# Patient Record
Sex: FEMALE | Race: WHITE | NOT HISPANIC OR LATINO | Employment: OTHER | URBAN - METROPOLITAN AREA
[De-identification: names, ages, dates, MRNs, and addresses within clinical notes are randomized per-mention and may not be internally consistent; named-entity substitution may affect disease eponyms.]

---

## 2017-07-19 ENCOUNTER — TELEPHONE (OUTPATIENT)
Dept: PEDIATRICS | Facility: CLINIC | Age: 37
End: 2017-07-19

## 2017-07-19 NOTE — TELEPHONE ENCOUNTER
Christine the pharmacy manager at [facility] calling that she has multiple scripts with our logo that she wants to confirm were written by us. Advised that they were not and requested that they be faxed to us. Supplied fax number.

## 2017-07-19 NOTE — LETTER
[address]  [address]  [facility] [id number] [phone]            To Christine, Pharmacy Manager  [facility]      The copies of the prescriptions that were faxed to us are not from this clinic. The provider on these scripts does not practice at a [facility]. This patient has never been seen at [facility].      Sincerely,          Sanjuanita Paez RN

## 2017-07-19 NOTE — TELEPHONE ENCOUNTER
Letter printed and faxed to Christine confirming that the prescriptions were not from this clinic after approval of clinic manager.   Sanjuanita Paez RN

## 2017-07-29 ENCOUNTER — HEALTH MAINTENANCE LETTER (OUTPATIENT)
Age: 37
End: 2017-07-29

## 2017-08-03 ENCOUNTER — TELEPHONE (OUTPATIENT)
Dept: PEDIATRICS | Facility: CLINIC | Age: 37
End: 2017-08-03

## 2017-08-03 NOTE — TELEPHONE ENCOUNTER
"Received a call from the [facility] pharmacy in [place], [place] asking to verify a Rx for oxycodone.  Per RN agreement we called the pharmacy back to confirm we were speaking with the pharmacist.      Spoke with Chiquita, the pharmacist, Rx was brought into the pharmacy on 7/29/17 by \"Anabel\", no last name given.  Pills were dispensed for Oxycodone 180 pills, 0 refills. Kailey Carter was the provider written on the Rx.     Received and copy of the Rx and drivers license # of the individual who dropped off the Rx from the pharmacist.     No police report filed.      Notified clinic administrator.     Della Roberts RN.  Nurse Triage             "

## 2022-12-02 ENCOUNTER — HOSPITAL ENCOUNTER (EMERGENCY)
Facility: CLINIC | Age: 42
Discharge: LEFT BEFORE DISCHARGE | End: 2022-12-02
Payer: MEDICARE

## 2022-12-02 VITALS
DIASTOLIC BLOOD PRESSURE: 74 MMHG | OXYGEN SATURATION: 98 % | TEMPERATURE: 98.5 F | RESPIRATION RATE: 20 BRPM | SYSTOLIC BLOOD PRESSURE: 113 MMHG | HEART RATE: 113 BPM | WEIGHT: 267.64 LBS | BODY MASS INDEX: 61.94 KG/M2 | HEIGHT: 55 IN

## 2022-12-03 NOTE — ED TRIAGE NOTES
Pt presents for evaluation of a boil to right buttock x 2 days. Had a boil in same area requiring surgery in June. Today in shower, felt like boil is getting bigger. Pt did squeeze the area today and noted a pus like drainage with blood.

## 2022-12-05 PROCEDURE — 99284 EMERGENCY DEPT VISIT MOD MDM: CPT

## 2022-12-06 ENCOUNTER — HOSPITAL ENCOUNTER (EMERGENCY)
Facility: CLINIC | Age: 42
Discharge: HOME | End: 2022-12-06
Attending: EMERGENCY MEDICINE | Admitting: EMERGENCY MEDICINE
Payer: MEDICARE

## 2022-12-06 VITALS
DIASTOLIC BLOOD PRESSURE: 96 MMHG | TEMPERATURE: 97.2 F | RESPIRATION RATE: 18 BRPM | WEIGHT: 269.62 LBS | SYSTOLIC BLOOD PRESSURE: 143 MMHG | HEART RATE: 93 BPM | OXYGEN SATURATION: 100 % | BODY MASS INDEX: 258.75 KG/M2

## 2022-12-06 DIAGNOSIS — L02.31 ABSCESS OF BUTTOCK, RIGHT: ICD-10-CM

## 2022-12-06 RX ORDER — ONDANSETRON 4 MG/1
4 TABLET, ORALLY DISINTEGRATING ORAL EVERY 8 HOURS PRN
Qty: 10 TABLET | Refills: 0 | Status: SHIPPED | OUTPATIENT
Start: 2022-12-06 | End: 2022-12-09

## 2022-12-06 RX ORDER — OXYCODONE HYDROCHLORIDE 5 MG/1
5 TABLET ORAL EVERY 6 HOURS PRN
Qty: 12 TABLET | Refills: 0 | Status: SHIPPED | OUTPATIENT
Start: 2022-12-06 | End: 2022-12-09

## 2022-12-06 RX ORDER — DOXYCYCLINE 100 MG/1
100 CAPSULE ORAL 2 TIMES DAILY
Qty: 14 CAPSULE | Refills: 0 | Status: SHIPPED | OUTPATIENT
Start: 2022-12-06 | End: 2022-12-13

## 2022-12-06 ASSESSMENT — ACTIVITIES OF DAILY LIVING (ADL): ADLS_ACUITY_SCORE: 33

## 2022-12-06 ASSESSMENT — ENCOUNTER SYMPTOMS
NAUSEA: 1
FEVER: 0
WOUND: 1
CHILLS: 1

## 2022-12-06 NOTE — ED TRIAGE NOTES
"Presents to triage with c/o a \"boil\" on R buttock that patient noticed a few days ago. Patient stated the area is painful and is not draining anything. Also endorses nausea     Triage Assessment     Row Name 12/05/22 7751       Triage Assessment (Adult)    Airway WDL WDL       Respiratory WDL    Respiratory WDL WDL       Cardiac WDL    Cardiac WDL WDL              "

## 2022-12-06 NOTE — ED PROVIDER NOTES
"  History   Chief Complaint:  Wound Check and Nausea       HPI   Esha Jacob is a 42 year old female with history of hypertension who presents with a \"boil' on her right buttock which she noticed a few days ago. The area is painful, and now is draining. She has chills and nausea. No fever. She has a history of an incision and drainage for a wound in a similar area.     Review of Systems   Constitutional: Positive for chills. Negative for fever.   Gastrointestinal: Positive for nausea.   Skin: Positive for wound.   All other systems reviewed and are negative.    Allergies:  No Known Allergies    Medications:  Albuterol   loratadine   Alprazolam   Lisinopril   Sertraline   Fluticasone   Metformin   semaglutide   Cholecalciferol   Tizanidine   Diazepam   Gabapentin     Past Medical History:     Hyperinsulinemia  Hypertension   TMJ dysfunction   Prediabetes  Carpal tunnel   Amenorrhea   Tobacco use disorder   DJD  Obesity   Hypercholesterolemia   Acid reflux   Endometrial polyp  Maternal postpartum thyroid dysfunction  PCOS  Chronic low back pain    Chronic bronchitis   Depression     Past Surgical History:     section   Carpal tunnel release 2x   Hysteroscopic resection of endometrial polyps   Hysterectomy   Excision right buttock mass     Family History:    Father: CAD    Social History:  The patient presents to the ED by herself.   PCP: Franky, [facility]     Physical Exam     Patient Vitals for the past 24 hrs:   BP Temp Temp src Pulse Resp SpO2 Weight   22 2325 134/69 97.2  F (36.2  C) Temporal 96 18 100 % 122.3 kg (269 lb 10 oz)     Physical Exam  General: Patient is alert, awake and interactive when I enter the room  Head: The scalp, face, and head appear normal  Eyes: Conjunctivae are normal  ENT: The nose is normal, Pinnae are normal, External acoustic canals are normal  Neck: Trachea midline  CV: Pulses are normal.   Resp: No respiratory distress   Musc: Normal muscular " tone, moving all extremities.  Skin: No rash or lesions noted  Rectal: brown stool.  Quarter sized abscess right buttock that has opened and drained.  There is surrounding erythema and warmth concerning for early cellulitis.  Neuro: Speech is normal and fluent. Face is symmetric.   Psych: Normal affect.  Appropriate interactions.    Emergency Department Course     Emergency Department Course:  Reviewed:  I reviewed nursing notes, vitals, past medical history and Care Everywhere    Assessments:  0129 I obtained history and examined the patient as noted above.     Disposition:  The patient was discharged to home.     Impression & Plan     Medical Decision Making:  Patient is a 42-year-old female who presents emergency department with a draining boil on her right buttock.  On physical examination the abscess has fully drained.  No indication for incision and drainage.  However there is some surrounding erythema and warmth concerning for early cellulitis.  Will cover with antibiotics that cover MRSA.  Recommended close follow-up with her primary care physician and returning to the emergency department any new or worsening symptoms.  Patient was given a short course of pain medication to use as needed.    Diagnosis:    ICD-10-CM    1. Abscess of buttock, right  L02.31           Discharge Medications:  New Prescriptions    DOXYCYCLINE HYCLATE (VIBRAMYCIN) 100 MG CAPSULE    Take 1 capsule (100 mg) by mouth 2 times daily for 7 days    ONDANSETRON (ZOFRAN ODT) 4 MG ODT TAB    Take 1 tablet (4 mg) by mouth every 8 hours as needed for nausea    OXYCODONE (ROXICODONE) 5 MG TABLET    Take 1 tablet (5 mg) by mouth every 6 hours as needed for pain       Scribe Disclosure:  SHA, Joshua Kjer, am serving as a scribe at 1:29 AM on 12/6/2022 to document services personally performed by Gaetano Hood MD based on my observations and the provider's statements to me.          Gaetano Hood MD  12/06/22  [id number]

## 2022-12-13 ENCOUNTER — HOSPITAL ENCOUNTER (EMERGENCY)
Facility: CLINIC | Age: 42
Discharge: HOME | End: 2022-12-14
Attending: EMERGENCY MEDICINE | Admitting: EMERGENCY MEDICINE
Payer: MEDICARE

## 2022-12-13 DIAGNOSIS — M54.50 ACUTE BILATERAL LOW BACK PAIN WITHOUT SCIATICA: ICD-10-CM

## 2022-12-13 LAB
ALBUMIN UR-MCNC: NEGATIVE MG/DL
APPEARANCE UR: CLEAR
BILIRUB UR QL STRIP: NEGATIVE
COLOR UR AUTO: YELLOW
GLUCOSE UR STRIP-MCNC: NEGATIVE MG/DL
HGB UR QL STRIP: NEGATIVE
HYALINE CASTS: 1 /LPF
KETONES UR STRIP-MCNC: NEGATIVE MG/DL
LEUKOCYTE ESTERASE UR QL STRIP: NEGATIVE
MUCOUS THREADS #/AREA URNS LPF: PRESENT /LPF
NITRATE UR QL: NEGATIVE
PH UR STRIP: 5.5 [PH] (ref 5–7)
RBC URINE: 1 /HPF
SP GR UR STRIP: 1.03 (ref 1–1.03)
SQUAMOUS EPITHELIAL: 1 /HPF
UROBILINOGEN UR STRIP-MCNC: NORMAL MG/DL
WBC URINE: 1 /HPF

## 2022-12-13 PROCEDURE — 99284 EMERGENCY DEPT VISIT MOD MDM: CPT | Mod: 25

## 2022-12-13 PROCEDURE — 250N000011 HC RX IP 250 OP 636: Performed by: EMERGENCY MEDICINE

## 2022-12-13 PROCEDURE — 81001 URINALYSIS AUTO W/SCOPE: CPT | Performed by: EMERGENCY MEDICINE

## 2022-12-13 RX ORDER — ONDANSETRON 4 MG/1
4 TABLET, ORALLY DISINTEGRATING ORAL ONCE
Status: COMPLETED | OUTPATIENT
Start: 2022-12-13 | End: 2022-12-13

## 2022-12-13 RX ADMIN — ONDANSETRON 4 MG: 4 TABLET, ORALLY DISINTEGRATING ORAL at 20:29

## 2022-12-14 VITALS
BODY MASS INDEX: 260.65 KG/M2 | WEIGHT: 271.61 LBS | RESPIRATION RATE: 17 BRPM | OXYGEN SATURATION: 98 % | HEART RATE: 90 BPM | TEMPERATURE: 97.7 F | DIASTOLIC BLOOD PRESSURE: 98 MMHG | SYSTOLIC BLOOD PRESSURE: 137 MMHG

## 2022-12-14 PROCEDURE — 250N000011 HC RX IP 250 OP 636: Performed by: EMERGENCY MEDICINE

## 2022-12-14 PROCEDURE — 96372 THER/PROPH/DIAG INJ SC/IM: CPT | Performed by: EMERGENCY MEDICINE

## 2022-12-14 RX ORDER — METHYLPREDNISOLONE 4 MG
TABLET, DOSE PACK ORAL
Qty: 21 TABLET | Refills: 0 | Status: SHIPPED | OUTPATIENT
Start: 2022-12-14

## 2022-12-14 RX ORDER — KETOROLAC TROMETHAMINE 15 MG/ML
15 INJECTION, SOLUTION INTRAMUSCULAR; INTRAVENOUS ONCE
Status: COMPLETED | OUTPATIENT
Start: 2022-12-14 | End: 2022-12-14

## 2022-12-14 RX ADMIN — KETOROLAC TROMETHAMINE 15 MG: 15 INJECTION, SOLUTION INTRAMUSCULAR; INTRAVENOUS at 00:09

## 2022-12-14 NOTE — DISCHARGE INSTRUCTIONS
Recommend 1000 g of Tylenol every 6 hours and 600 mg of ibuprofen every 6 hours.  You should take the Medrol Dosepak as prescribed, I recommend following up with your spine team as you have planned, you should make an appointment with primary care down here as well.    Discharge Instructions  Back Pain  You were seen today for back pain. Back pain can have many causes, but most will get better without surgery or other specific treatment. Sometimes there is a herniated ( slipped ) disc. We do not usually do MRI scans to look for these right away, since most herniated discs will get better on their own with time.  Today, we did not find any evidence that your back pain was caused by a serious condition. However, sometimes symptoms develop over time and cannot be found during an emergency visit, so it is very important that you follow up with your primary provider.  Generally, every Emergency Department visit should have a follow-up clinic visit with either a primary or a specialty clinic/provider. Please follow-up as instructed by your emergency provider today.    Return to the Emergency Department if:  You develop a fever with your back pain.   You have weakness or change in sensation in one or both legs.  You lose control of your bowels or bladder, or cannot empty your bladder (cannot pee).  Your pain gets much worse.     Follow-up with your provider:  Unless your pain has completely gone away, please make an appointment with your provider within one week. Most of the routine care for back pain is available in a clinic and not the Emergency Department. You may need further management of your back pain, such as more pain medication, imaging such as an X-ray or MRI, or physical therapy.    What can I do to help myself?  Remain Active -- People are often afraid that they will hurt their back further or delay recovery by remaining active, but this is one of the best things you can do for your back. In fact, staying in  bed for a long time to rest is not recommended. Studies have shown that people with low back pain recover faster when they remain active. Movement helps to bring blood flow to the muscles and relieve muscle spasms as well as preventing loss of muscle strength.  Heat -- Using a heating pad can help with low back pain during the first few weeks. Do not sleep with a heating pad, as you can be burned.   Pain medications - You may take a pain medication such as Tylenol  (acetaminophen), Advil , Motrin  (ibuprofen) or Aleve  (naproxen).  If you were given a prescription for medicine here today, be sure to read all of the information (including the package insert) that comes with your prescription.  This will include important information about the medicine, its side effects, and any warnings that you need to know about.  The pharmacist who fills the prescription can provide more information and answer questions you may have about the medicine.  If you have questions or concerns that the pharmacist cannot address, please call or return to the Emergency Department.   Remember that you can always come back to the Emergency Department if you are not able to see your regular provider in the amount of time listed above, if you get any new symptoms, or if there is anything that worries you.

## 2022-12-14 NOTE — ED TRIAGE NOTES
Pt complains of lower back for the past 1 wk. Pt states the pain got so much worse today that it brought her in today. Pt denies loss of bowel/bladder. Pt states she had an ablation on back done in October and November - pt states she is unable to see her back doctor till the end of December. Pt states she takes tylenol every 6 hours with no relief. Pt complains of nausea. Pt states her last tylenol was at 1930.    Triage Assessment     Row Name 12/13/22 2019       Triage Assessment (Adult)    Airway WDL WDL       Respiratory WDL    Respiratory WDL WDL       Skin Circulation/Temperature WDL    Skin Circulation/Temperature WDL WDL       Cardiac WDL    Cardiac WDL WDL       Peripheral/Neurovascular WDL    Peripheral Neurovascular WDL WDL       Cognitive/Neuro/Behavioral WDL    Cognitive/Neuro/Behavioral WDL WDL

## 2022-12-14 NOTE — ED PROVIDER NOTES
History     Chief Complaint:  Back Pain       HPI   Esha Jacob is a 42 year old who presents with acute on chronic low back pain.  She has had ablations in the past, she tried to get into her pain clinic but was unsuccessful.  She denies any surgeries.  No recent injections.  Denies any bowel or bladder incontinence, lower leg weakness or fevers.  Denies any recent trauma.    ROS:  Review of Systems  10 point ROS completed, negative except as indicated in the HPI.      Allergies:  No Known Allergies     Medications:    Lisinopril  Zoloft  Prilosec  Albuterol    Past Medical History:    Hypertension  Chronic bronchitis  Prediabetes  Obesity  PCOS  Chronic back pain    Past Surgical History:    Carpal tunnel release  Hysterectomy  Low back ablations    Family History:    family history is not on file.    Social History:     PCP: Franky, In[facility]     Physical Exam   Patient Vitals for the past 24 hrs:   BP Temp Temp src Pulse Resp SpO2 Weight   12/13/22 2024 (!) 157/121 97.7  F (36.5  C) Oral 90 17 99 % 123.2 kg (271 lb 9.7 oz)        Physical Exam  Constitutional: Alert, attentive, GCS 15   Eyes: EOM are normal, anicteric, conjugate gaze  CV: distal extremities warm, well perfused  Chest: Non-labored breathing on RA   MSK: Steady gait, no significant back tenderness.  Neurological: Alert, attentive, moving all extremities equally.   Skin: Skin is warm and dry.        Emergency Department Course     Laboratory:  Labs Ordered and Resulted from Time of ED Arrival to Time of ED Departure   ROUTINE UA WITH MICROSCOPIC REFLEX TO CULTURE - Abnormal       Result Value    Color Urine Yellow      Appearance Urine Clear      Glucose Urine Negative      Bilirubin Urine Negative      Ketones Urine Negative      Specific Gravity Urine 1.030      Blood Urine Negative      pH Urine 5.5      Protein Albumin Urine Negative      Urobilinogen Urine Normal      Nitrite Urine Negative      Leukocyte Esterase  Urine Negative      Mucus Urine Present (*)     RBC Urine 1      WBC Urine 1      Squamous Epithelials Urine 1      Hyaline Casts Urine 1        Emergency Department Course:  Reviewed:  I reviewed nursing notes, vitals and past medical history    Interventions:  Medications   ondansetron (ZOFRAN ODT) ODT tab 4 mg (4 mg Oral Given 22)        Disposition:  The patient was discharged to home.     Impression & Plan      Medical Decision Makin-year-old woman past medical history significant for chronic low back pain who has had ablations for radicular pain in the past presenting for gradual worsening of her typical pain.  No reported trauma, no infectious symptoms.  She has no red flag symptoms.  She was ambulatory with a steady gait, no indication for imaging.  She is only been taking Tylenol at home, I counseled her to add ibuprofen, I will put her on a Medrol Dosepak and recommended PCP follow-up.   UA was unremarkable, she does not have any urinary symptoms.  She is status post hysterectomy.    Diagnosis:    ICD-10-CM    1. Acute bilateral low back pain without sciatica  M54.50 Primary Care Referral           Discharge Medications:  New Prescriptions    METHYLPREDNISOLONE (MEDROL DOSEPAK) 4 MG TABLET THERAPY PACK    Follow Package Directions        Shakir Beckett MD  [employer]  12:19 AM 22          Shakir Beckett MD  22 0019

## 2022-12-16 ENCOUNTER — HOSPITAL ENCOUNTER (EMERGENCY)
Facility: CLINIC | Age: 42
Discharge: HOME | End: 2022-12-16
Attending: EMERGENCY MEDICINE | Admitting: EMERGENCY MEDICINE
Payer: MEDICARE

## 2022-12-16 VITALS
HEART RATE: 83 BPM | RESPIRATION RATE: 16 BRPM | OXYGEN SATURATION: 99 % | DIASTOLIC BLOOD PRESSURE: 97 MMHG | TEMPERATURE: 97.9 F | SYSTOLIC BLOOD PRESSURE: 151 MMHG

## 2022-12-16 DIAGNOSIS — R20.2 PARESTHESIAS: ICD-10-CM

## 2022-12-16 DIAGNOSIS — G89.29 CHRONIC RIGHT-SIDED LOW BACK PAIN WITH RIGHT-SIDED SCIATICA: ICD-10-CM

## 2022-12-16 DIAGNOSIS — M54.41 CHRONIC RIGHT-SIDED LOW BACK PAIN WITH RIGHT-SIDED SCIATICA: ICD-10-CM

## 2022-12-16 PROCEDURE — 250N000011 HC RX IP 250 OP 636: Performed by: EMERGENCY MEDICINE

## 2022-12-16 PROCEDURE — 96372 THER/PROPH/DIAG INJ SC/IM: CPT | Performed by: EMERGENCY MEDICINE

## 2022-12-16 PROCEDURE — 96374 THER/PROPH/DIAG INJ IV PUSH: CPT

## 2022-12-16 PROCEDURE — 93005 ELECTROCARDIOGRAM TRACING: CPT

## 2022-12-16 PROCEDURE — 250N000013 HC RX MED GY IP 250 OP 250 PS 637: Performed by: EMERGENCY MEDICINE

## 2022-12-16 PROCEDURE — 99284 EMERGENCY DEPT VISIT MOD MDM: CPT

## 2022-12-16 RX ORDER — KETOROLAC TROMETHAMINE 30 MG/ML
60 INJECTION, SOLUTION INTRAMUSCULAR; INTRAVENOUS ONCE
Status: COMPLETED | OUTPATIENT
Start: 2022-12-16 | End: 2022-12-16

## 2022-12-16 RX ORDER — IBUPROFEN 600 MG/1
600 TABLET, FILM COATED ORAL ONCE
Status: COMPLETED | OUTPATIENT
Start: 2022-12-16 | End: 2022-12-16

## 2022-12-16 RX ADMIN — IBUPROFEN 600 MG: 600 TABLET, FILM COATED ORAL at 16:15

## 2022-12-16 RX ADMIN — KETOROLAC TROMETHAMINE 60 MG: 30 INJECTION, SOLUTION INTRAMUSCULAR; INTRAVENOUS at 17:38

## 2022-12-16 ASSESSMENT — ENCOUNTER SYMPTOMS
NUMBNESS: 1
COUGH: 0
DYSURIA: 0
SHORTNESS OF BREATH: 0
CHILLS: 0
BACK PAIN: 1
FEVER: 0

## 2022-12-16 NOTE — DISCHARGE INSTRUCTIONS
Discharge Instructions  Back Pain  You were seen today for back pain. Back pain can have many causes, but most will get better without surgery or other specific treatment. Sometimes there is a herniated ( slipped ) disc. We do not usually do MRI scans to look for these right away, since most herniated discs will get better on their own with time.  Today, we did not find any evidence that your back pain was caused by a serious condition. However, sometimes symptoms develop over time and cannot be found during an emergency visit, so it is very important that you follow up with your primary provider.  Generally, every Emergency Department visit should have a follow-up clinic visit with either a primary or a specialty clinic/provider. Please follow-up as instructed by your emergency provider today.    Return to the Emergency Department if:  You develop a fever with your back pain.   You have weakness or change in sensation in one or both legs.  You lose control of your bowels or bladder, or cannot empty your bladder (cannot pee).  Your pain gets much worse.     Follow-up with your provider:  Unless your pain has completely gone away, please make an appointment with your provider within one week. Most of the routine care for back pain is available in a clinic and not the Emergency Department. You may need further management of your back pain, such as more pain medication, imaging such as an X-ray or MRI, or physical therapy.    What can I do to help myself?  Remain Active -- People are often afraid that they will hurt their back further or delay recovery by remaining active, but this is one of the best things you can do for your back. In fact, staying in bed for a long time to rest is not recommended. Studies have shown that people with low back pain recover faster when they remain active. Movement helps to bring blood flow to the muscles and relieve muscle spasms as well as preventing loss of muscle strength.  Heat  -- Using a heating pad can help with low back pain during the first few weeks. Do not sleep with a heating pad, as you can be burned.   Pain medications - You may take a pain medication such as Tylenol  (acetaminophen), Advil , Motrin  (ibuprofen) or Aleve  (naproxen).  If you were given a prescription for medicine here today, be sure to read all of the information (including the package insert) that comes with your prescription.  This will include important information about the medicine, its side effects, and any warnings that you need to know about.  The pharmacist who fills the prescription can provide more information and answer questions you may have about the medicine.  If you have questions or concerns that the pharmacist cannot address, please call or return to the Emergency Department.   Remember that you can always come back to the Emergency Department if you are not able to see your regular provider in the amount of time listed above, if you get any new symptoms, or if there is anything that worries you.

## 2022-12-16 NOTE — ED PROVIDER NOTES
History   Chief Complaint:  Back Pain and Numbness     The history is provided by the patient.      Esha Jacob is a 42 year old female with history of chronic low back pain who presents with back pain and numbness. Patient states she was seen by a spine physician yesterday who instructed that they wanted new images of her lower back before putting any injections in. She was prescribed medications, which she has not taken due to receiving them today as she had some insurance problems when she tried to get it originally. Patient states that she may have a bulging disc that has caused her to have right left numbness and right arm numbness. Her right arm numbness started today and has been gradual, which prompted her to come into the Emergency Department. However, she denies having any chest pain, shortness of breath, fevers, chills, coughs, or dysuria. Denies history of heart problems. Denies falls or trauma. Patient reports to vape and smoke cigarettes once in a while. Patient is a .     Review of Systems   Constitutional: Negative for chills and fever.   Respiratory: Negative for cough and shortness of breath.    Cardiovascular: Negative for chest pain.   Genitourinary: Negative for dysuria.   Musculoskeletal: Positive for back pain.   Neurological: Positive for numbness.     Allergies:  The patient has no known allergies.     Medications:  methylPREDNISolone   metFORMIN   omeprazole   lisinopril     Past Medical History:     Hyperinsulinemia  hypertension  TMJ dysfunction  Prediabetes  PCOS   Chronic low back pain  Acid reflux  Hypertriglyceridemia  Class 3 severe obesity   DJD   Tobacco use disorder  Maternal postpartum thyroid dysfunction  Carpal tunnel syndrome    Past Surgical History:    The patient denies past surgical history.     Family History:    Father: CAD    Social History:  The patient presents to the ED alone.  [facility]     Physical Exam     Patient  Vitals for the past 24 hrs:   BP Temp Temp src Pulse Resp SpO2   22 1610 (!) 151/97 97.9  F (36.6  C) Temporal 83 16 99 %     Physical Exam  Constitutional: Well developed, nontox appearance  Head: Atraumatic.   Neck:  no stridor  Eyes: no scleral icterus  Cardiovascular: RRR, 2+ bilat radial pulses  Pulmonary/Chest: nml resp effort  Abdominal: ND, soft, NT, no rebound or guarding   Back: Reproducible right lumbar paraspinal muscle tenderness  Ext: Warm, well perfused, no edema  Neurological: A&O, symmetric facies, moves ext x4, 5/5 strength throughout bilateral upper and lower extremities, sensation grossly intact  Skin: Skin is warm and dry.   Psychiatric: Behavior is normal. Thought content normal.   Nursing note and vitals reviewed.    Emergency Department Course     ECG:  ECG taken at 1741, ECG read at 1743  Normal sinus rhythm  Possible Inferior infarct, age undetermined  Abnormal ECG   No prior EKG to compare to.   Rate 79 bpm. KY interval 150 ms. QRS duration 94 ms. QT/QTc 380/435 ms. P-R-T axes 20 3 20.     Imaging:  No orders to display     Report per radiology    Laboratory:  Labs Ordered and Resulted from Time of ED Arrival to Time of ED Departure - No data to display   Emergency Department Course:     Reviewed:  I reviewed nursing notes, vitals, past medical history, Care Everywhere and [facility]    Assessments:   I obtained history and examined the patient as noted above.   1753 I rechecked the patient and explained findings.    Interventions:  Medications   ibuprofen (ADVIL/MOTRIN) tablet 600 mg (600 mg Oral Given 22 1615)   ketorolac (TORADOL) injection 60 mg (60 mg Intramuscular Given 22 173)     Disposition:  The patient was discharged to home.     Impression & Plan     CMS Diagnoses: None.    Medical Decision Makin year old female presenting w/ chronic right lower back pain, right upper extremity paresthesias     Differential diagnosis includes sciatica, muscle strain,  muscle spasm, cervical DDD, lumbar DDD, herniated disc.  Doubt intra-abdominal pathology.  Patient's presentation is consistent with chronic back pain and sciatica.  There are no red flag signs or symptoms to indicate acute cauda equina syndrome, spinal cord compromise.  Patient's neurologic exam is reassuring and there are no objective sensory deficits or weakness on exam.  Perfusion is reassuring.  Doubt vascular catastrophe.  Less likely atypical ACS given the patient's age and screening EKG demonstrated normal sinus rhythm without acute signs of ischemia.  I do not feel advanced imaging is indicated from the emergency department given lack of red flag signs or symptoms as previously mentioned.  Patient was advised to follow-up with her primary care physician and primary spine provider for further pain management, work restrictions and work-up.  She reportedly has outpatient MRIs ordered and is waiting to schedule these. At this time I feel the pt is safe for discharge.  Recommendations given regarding return to the emergency department as needed for new or worsening symptoms.  Pt counseled on all results, disposition and diagnosis.  They are understanding and agreeable to plan. Patient discharged in stable condition.      Diagnosis:    ICD-10-CM    1. Chronic right-sided low back pain with right-sided sciatica  M54.41     G89.29       2. Paresthesias  R20.2         Discharge Medications:  Discharge Medication List as of 12/16/2022  5:53 PM        Scribe Disclosure:  Rito DALTONpenelope Inder, am serving as a scribe at 5:15 PM on 12/16/2022 to document services personally performed by Gaetano Bhatt MD based on my observations and the provider's statements to me.       Gaetano Bhatt MD  12/16/22 5405

## 2022-12-16 NOTE — LETTER
December 16, 2022      To Whom It May Concern:      Esha Jacob was seen in our Emergency Department today, 12/16/22.  I expect her condition to improve over the next 4 days.  She may return to work when improved.  Further work notes should come from the patient's primary care provider or primary spinal clinic.    Sincerely,        Gaetano Bhatt MD

## 2022-12-16 NOTE — ED TRIAGE NOTES
seen recently in ER for lower back. Given referral, saw that MD yesterday. Was given a work note to return to work with restrictions, but patient reports that she is unable to work despite the restrictions. She is requesting a note for further absence from work. She also complains of continued pain in back and numbness in right arm and leg. BEFAST negative in triage.

## 2022-12-19 LAB
ATRIAL RATE - MUSE: 79 BPM
DIASTOLIC BLOOD PRESSURE - MUSE: NORMAL MMHG
INTERPRETATION ECG - MUSE: NORMAL
P AXIS - MUSE: 20 DEGREES
PR INTERVAL - MUSE: 150 MS
QRS DURATION - MUSE: 94 MS
QT - MUSE: 380 MS
QTC - MUSE: 435 MS
R AXIS - MUSE: 3 DEGREES
SYSTOLIC BLOOD PRESSURE - MUSE: NORMAL MMHG
T AXIS - MUSE: 20 DEGREES
VENTRICULAR RATE- MUSE: 79 BPM

## 2023-06-01 ENCOUNTER — HOSPITAL ENCOUNTER (EMERGENCY)
Facility: CLINIC | Age: 43
Discharge: HOME | End: 2023-06-01
Attending: EMERGENCY MEDICINE | Admitting: EMERGENCY MEDICINE
Payer: MEDICARE

## 2023-06-01 VITALS
SYSTOLIC BLOOD PRESSURE: 128 MMHG | HEART RATE: 89 BPM | DIASTOLIC BLOOD PRESSURE: 93 MMHG | OXYGEN SATURATION: 99 % | RESPIRATION RATE: 18 BRPM | TEMPERATURE: 98.1 F

## 2023-06-01 DIAGNOSIS — J06.9 UPPER RESPIRATORY TRACT INFECTION, UNSPECIFIED TYPE: ICD-10-CM

## 2023-06-01 DIAGNOSIS — R11.0 NAUSEA: ICD-10-CM

## 2023-06-01 DIAGNOSIS — J02.9 SORE THROAT: ICD-10-CM

## 2023-06-01 LAB
FLUAV RNA SPEC QL NAA+PROBE: NEGATIVE
FLUBV RNA RESP QL NAA+PROBE: NEGATIVE
GROUP A STREP BY PCR: NOT DETECTED
RSV RNA SPEC NAA+PROBE: NEGATIVE
SARS-COV-2 RNA RESP QL NAA+PROBE: NEGATIVE

## 2023-06-01 PROCEDURE — 250N000011 HC RX IP 250 OP 636: Performed by: EMERGENCY MEDICINE

## 2023-06-01 PROCEDURE — 87637 SARSCOV2&INF A&B&RSV AMP PRB: CPT | Performed by: EMERGENCY MEDICINE

## 2023-06-01 PROCEDURE — 87651 STREP A DNA AMP PROBE: CPT | Performed by: EMERGENCY MEDICINE

## 2023-06-01 PROCEDURE — 99284 EMERGENCY DEPT VISIT MOD MDM: CPT

## 2023-06-01 PROCEDURE — 250N000013 HC RX MED GY IP 250 OP 250 PS 637: Performed by: EMERGENCY MEDICINE

## 2023-06-01 RX ORDER — ONDANSETRON 4 MG/1
4 TABLET, ORALLY DISINTEGRATING ORAL ONCE
Status: COMPLETED | OUTPATIENT
Start: 2023-06-01 | End: 2023-06-01

## 2023-06-01 RX ORDER — ACETAMINOPHEN 500 MG
1000 TABLET ORAL ONCE
Status: COMPLETED | OUTPATIENT
Start: 2023-06-01 | End: 2023-06-01

## 2023-06-01 RX ORDER — ONDANSETRON 4 MG/1
4 TABLET, ORALLY DISINTEGRATING ORAL EVERY 8 HOURS PRN
Qty: 10 TABLET | Refills: 0 | Status: SHIPPED | OUTPATIENT
Start: 2023-06-01

## 2023-06-01 RX ADMIN — ONDANSETRON 4 MG: 4 TABLET, ORALLY DISINTEGRATING ORAL at 19:43

## 2023-06-01 RX ADMIN — ACETAMINOPHEN 1000 MG: 500 TABLET, FILM COATED ORAL at 19:43

## 2023-06-01 NOTE — ED TRIAGE NOTES
"Patient reports she is \"not feeling well\".  Patient reports 5 days of ongoing throat pain and a generalized feeling of unwell.       Triage Assessment     Row Name 06/01/23 3696       Triage Assessment (Adult)    Airway WDL WDL       Respiratory WDL    Respiratory WDL WDL       Skin Circulation/Temperature WDL    Skin Circulation/Temperature WDL WDL       Cardiac WDL    Cardiac WDL WDL       Peripheral/Neurovascular WDL    Peripheral Neurovascular WDL WDL       Cognitive/Neuro/Behavioral WDL    Cognitive/Neuro/Behavioral WDL WDL              "

## 2023-06-02 NOTE — ED PROVIDER NOTES
History     Chief Complaint:  Pharyngitis       HPI   Esha Jacob is a 42 year old female who presents with malaise and sore throat. Sx onset 5 days ago. Pt has pain with swallowing. She also has two white spots on her tongue she is worried about. He mother was hospitalized with a pneumonia recently. Pt has nausea and cough. No SOB. NO rash. No fever. No abdominal pain. No neck stiffness. Pt has not taken anything for her sx.      Medications:    amoxicillin-clavulanate (AUGMENTIN) 875-125 MG tablet  ondansetron (ZOFRAN ODT) 4 MG ODT tab  methylPREDNISolone (MEDROL DOSEPAK) 4 MG tablet therapy pack        Past Medical History:    No past medical history on file.    Past Surgical History:    No past surgical history on file.     Physical Exam     Patient Vitals for the past 24 hrs:   BP Temp Pulse Resp SpO2   06/01/23 1948 -- -- -- -- 99 %   06/01/23 1946 (!) 128/93 -- -- -- --   06/01/23 1520 137/89 98.1  F (36.7  C) 89 18 100 %        Physical Exam  VS: Reviewed per above  HENT: Mucous membranes moist. Uvula midline, no tonsillar hypertrophy nor asymmetry. Tolerating secretions, normal phonation. No nuchal rigidity. There is a focal white papule vs pustule left tonisllar fossa.   EYES: sclera anicteric  CV: Rate as noted, regular rhythm.   RESP: Effort normal. Breath sounds are normal bilaterally.  NEURO: Alert, moving all extremities  MSK: No deformity of the extremities  SKIN: Warm and dry        Emergency Department Course       Laboratory:  Labs Ordered and Resulted from Time of ED Arrival to Time of ED Departure   GROUP A STREPTOCOCCUS PCR THROAT SWAB - Normal       Result Value    Group A strep by PCR Not Detected     INFLUENZA A/B, RSV, & SARS-COV2 PCR          Emergency Department Course & Assessments:             Interventions:  Medications   ondansetron (ZOFRAN ODT) ODT tab 4 mg (4 mg Oral $Given 6/1/23 1943)   acetaminophen (TYLENOL) tablet 1,000 mg (1,000 mg Oral $Given 6/1/23 1943)             Disposition:  The patient was discharged to home.     Impression & Plan        Medical Decision Making:  Pt presents with sore throat, nausea, cough, malaise x5 days. VS reassuring. Based on hx and exam low suspicion for meningitis, PTA, RPA, epiglottitis, PNA. Rapid strep negative. Lungs CTAB, doubt PNA. With question of tonsillar fossa pustule vs tonsillith plan for course augmentin. With nause plan for zofran. Abdominal exam benign. Low suspicion for sinister or surgical intraabdominal process.  RTED precautions discussed. COVID/flu/rsv testing pending at discharge.     Diagnosis:    ICD-10-CM    1. Sore throat  J02.9       2. Upper respiratory tract infection, unspecified type  J06.9       3. Nausea  R11.0            Discharge Medications:  Discharge Medication List as of 6/1/2023  7:47 PM      START taking these medications    Details   amoxicillin-clavulanate (AUGMENTIN) 875-125 MG tablet Take 1 tablet by mouth 2 times daily for 7 days, Disp-14 tablet, R-0, Local Print      ondansetron (ZOFRAN ODT) 4 MG ODT tab Take 1 tablet (4 mg) by mouth every 8 hours as needed for nausea or vomiting, Disp-10 tablet, R-0, Local Print                 Eugenio Munoz MD  06/01/23 2029

## 2023-06-02 NOTE — ED NOTES
PIT/Triage Evaluation    Patient presented with malaise and sore throat. Sx onset 5 days ago. Pt has pain with swallowing. She also has two white spots on her tongue she is worried about. He mother was hospitalized with a pneumonia recently. Pt has nausea and cough. No fever. No abdominal pain. No neck stiffness. Pt has not taken anything for her sx.    Exam is notable for:    Patient Vitals for the past 24 hrs:   BP Temp Pulse Resp SpO2   06/01/23 1520 137/89 98.1  F (36.7  C) 89 18 100 %     General:  Alert, interactive  Cardiovascular:  Well perfused  Lungs:  No respiratory distress, no accessory muscle use  Neuro:  Moving all 4 extremities  Skin:  Warm, dry  Psych:  Normal affect    Appropriate interventions for symptom management were initiated if applicable.  Appropriate diagnostic tests were initiated if indicated.    Important information for subsequent clinician:      I briefly evaluated the patient and developed an initial plan of care. I discussed this plan and explained that this brief interaction does not constitute a full evaluation. Patient/family understands that they should wait to be fully evaluated and discuss any test results with another clinician prior to leaving the hospital.       Eugenio Munoz MD  06/01/23 5718

## 2023-06-02 NOTE — DISCHARGE INSTRUCTIONS
Discharge Instructions  Sore Throat  You were seen today for a sore throat.  Most (>80%) sore throats are caused by a virus. Antibiotics do not help with viral infections, but you can fight off the virus on your own.  In this case, your sore throat would be treated with medications for your pain and fever.    Strep throat is a kind of sore throat caused by Group A streptococcus bacteria.  This type of sore throat is treated with antibiotics.  If you had a rapid test done today for strep throat and it did not show infection, a culture is done in some cases. The culture can take several days to complete. If the culture shows you have strep throat, we will call you and get you a prescription for antibiotics. We will not contact you with a negative culture result.  Generally, every Emergency Department visit should have a follow-up clinic visit with either a primary or a specialty clinic/provider. Please follow-up as instructed by your emergency provider today.  Return to the Emergency Department if:  If you have difficulty breathing.  If you are drooling because you are unable to swallow.  You become dehydrated due to difficulty drinking. Signs of dehydration include weakness, dry mouth, and urinating less than 3 times per day.  If you develop swelling of the neck or tongue.  If you develop a high fever with either severe or unusual headache or stiff neck.    Treatment:    Pain relief -- Non-prescription pain medications, such as Tylenol  (acetaminophen) or Motrin , Advil  (ibuprofen) are usually recommended for pain.  Do not use a medicine that you are allergic to, or if your provider has told you not to use it.  Soft or liquid diet. Concentrate on liquids to keep yourself hydrated. Cold liquids (popsicles, ice cream, etc.) may feel good on your throat.  If you were given a prescription for medicine here today, be sure to read all of the information (including the package insert) that comes with your prescription.   This will include important information about the medicine, its side effects, and any warnings that you need to know about.  The pharmacist who fills the prescription can provide more information and answer questions you may have about the medicine.  If you have questions or concerns that the pharmacist cannot address, please call or return to the Emergency Department.   Remember that you can always come back to the Emergency Department if you are not able to see your regular provider in the amount of time listed above, if you get any new symptoms, or if there is anything that worries you.    Discharge Instructions  Upper Respiratory Infection    The upper respiratory tract includes the sinuses, nasal passages, pharynx, and larynx. A URI, or upper respiratory infection, is an infection of any of the parts of the upper airway. Symptoms include runny nose, congestion, sneezing, sore throat, cough, and fever. URIs are almost always caused by a virus. Antibiotics do not help with viral infections, so are generally not prescribed. A URI is very contagious through coughing and nasal secretions; make sure you wash your hands often and clean surfaces after sneezing, coughing or touching them. While you should start to improve in 3 - 5 days, remember that sometimes a cough can linger for several weeks.    Generally, every Emergency Department visit should have a follow-up clinic visit with either a primary or a specialty clinic/provider. Please follow-up as instructed by your emergency provider today.    Return to the Emergency Department if:  Any of your symptoms get much worse.  You seem very sick, like being too weak to get up.  You have chest pain or shortness of breath.   You have a severe headache.  You are vomiting (throwing up) so much you cannot keep fluids or medicines down.  You have confusion or seem unusually drowsy.  You have a seizure.    What can I do to help myself?  Fill any prescriptions the provider gave  you and take them right away  If you have a fever, get plenty of rest and drink lots of fluids, especially water.  Using a humidifier or saline nose spray will also help loosen mucous.   Clothes or blankets will not change your fever. Do what is comfortable for you.  Bathing or sponging in lukewarm water may help you feel better.  Acetaminophen (Tylenol ) or ibuprofen (Advil , Motrin ) will help bring fever down and may help you feel more comfortable. Be sure to read and follow the package directions, and ask your provider if you have questions.  Do not drink alcohol.  Decongestants may help you feel better. You may use decongestant nose sprays Afrin  (oxymetazoline) or Jamir-Synephrine  (phenylephrine hydrochloride) for up to 3 days, or may use a decongestant tablet like Sudafed  (pseudoephedrine).  If you were given a prescription for medicine here today, be sure to read all of the information (including the package insert) that comes with your prescription.  This will include important information about the medicine, its side effects, and any warnings that you need to know about.  The pharmacist who fills the prescription can provide more information and answer questions you may have about the medicine.  If you have questions or concerns that the pharmacist cannot address, please call or return to the Emergency Department.   Remember that you can always come back to the Emergency Department if you are not able to see your regular provider in the amount of time listed above, if you get any new symptoms, or if there is anything that worries you.    Discharge Instructions  Vomiting    You have been seen today for vomiting (throwing up). This is usually caused by a virus, but some bacteria, parasites, medicines or other medical conditions can cause similar symptoms. At this time your provider does not find that your vomiting is a sign of anything dangerous or life-threatening. However, sometimes the signs of serious  illness do not show up right away. If you have new or worse symptoms, you may need to be seen again in the Emergency Department or by your primary provider. Remember that serious problems like appendicitis can start as vomiting.    Generally, every Emergency Department visit should have a follow-up clinic visit with either a primary or a specialty clinic/provider. Please follow-up as instructed by your emergency provider today.    Return to the Emergency Department if:  You keep vomiting and you are not able to keep liquids down.   You feel you are getting dehydrated, such as being very thirsty, not urinating (peeing) at least every 8-12 hours, or feeling faint or lightheaded.   You develop a new fever, or your fever continues for more than 2 days.   You have abdominal (belly pain) that seems worse than cramps, is in one spot, or is getting worse over time. Appendicitis usually causes pain in the right lower abdomen (to the right and below your belly button) so watch for pain in this location.  You have blood in your vomit or stools.   You feel very weak.  You are not starting to improve within 24 hours of your visit here.     What can I do to help myself?  The most important thing to do is to drink clear liquids. If you have been vomiting a lot, it is best to have only small, frequent sips of liquids. Drinking too much at once may cause more vomiting. If you are vomiting often, you must replace minerals, sodium and potassium lost with your illness. Pedialyte  is the best available rehydration liquid but some find that it doesn t taste good so sports drinks are an alterative. You can also drink clear liquids such as water, weak tea, apple juice, and 7-Up . Avoid acid liquids (orange), caffeine (coffee) or alcohol. Do not drink milk until you no longer have diarrhea (loose stools).   After liquids are staying down, you may start eating mild foods. Soda crackers, toast, plain noodles, gelatin, applesauce and bananas  are good first choices. Avoid foods that have acid, are spicy, fatty or have a lot of fiber (such as meats, coarse grains, vegetables). You may start eating these foods again in about 3 days when you are better.   Sometimes treatment includes prescription medicine to prevent nausea (sick to your stomach) and vomiting. If your provider prescribes these for you, take them as directed.   Do not take ibuprofen, naproxen, or other nonsteroidal anti-inflammatory (NSAID) medicines without checking with your healthcare provider.     If you were given a prescription for medicine here today, be sure to read all of the information (including the package insert) that comes with your prescription.  This will include important information about the medicine, its side effects, and any warnings that you need to know about.  The pharmacist who fills the prescription can provide more information and answer questions you may have about the medicine.  If you have questions or concerns that the pharmacist cannot address, please call or return to the Emergency Department.     Remember that you can always come back to the Emergency Department if you are not able to see your regular provider in the amount of time listed above, if you get any new symptoms, or if there is anything that worries you.